# Patient Record
Sex: FEMALE | Race: BLACK OR AFRICAN AMERICAN
[De-identification: names, ages, dates, MRNs, and addresses within clinical notes are randomized per-mention and may not be internally consistent; named-entity substitution may affect disease eponyms.]

---

## 2017-04-02 ENCOUNTER — HOSPITAL ENCOUNTER (EMERGENCY)
Dept: HOSPITAL 74 - JERFT | Age: 37
Discharge: HOME | End: 2017-04-02
Payer: COMMERCIAL

## 2017-04-02 VITALS — TEMPERATURE: 98.5 F | DIASTOLIC BLOOD PRESSURE: 91 MMHG | HEART RATE: 78 BPM | SYSTOLIC BLOOD PRESSURE: 184 MMHG

## 2017-04-02 VITALS — BODY MASS INDEX: 57.9 KG/M2
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
## 2017-04-02 DIAGNOSIS — S61.411A: Primary | ICD-10-CM

## 2017-04-02 LAB
APPEARANCE UR: CLEAR
BILIRUB UR STRIP.AUTO-MCNC: NEGATIVE MG/DL
COLOR UR: (no result)
KETONES UR QL STRIP: NEGATIVE
LEUKOCYTE ESTERASE UR QL STRIP.AUTO: NEGATIVE
NITRITE UR QL STRIP: NEGATIVE
PH UR: 6 [PH] (ref 5–8)
PROT UR QL STRIP: NEGATIVE
PROT UR QL STRIP: NEGATIVE
RBC # UR STRIP: NEGATIVE /UL
SP GR UR: 1.02 (ref 1–1.03)
UROBILINOGEN UR STRIP-MCNC: NEGATIVE E.U./DL (ref 0.2–1)

## 2017-04-02 PROCEDURE — 3E0234Z INTRODUCTION OF SERUM, TOXOID AND VACCINE INTO MUSCLE, PERCUTANEOUS APPROACH: ICD-10-PCS
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
## 2018-10-27 ENCOUNTER — HOSPITAL ENCOUNTER (EMERGENCY)
Dept: HOSPITAL 74 - JERFT | Age: 38
Discharge: HOME | End: 2018-10-27
Payer: COMMERCIAL

## 2018-10-27 VITALS — SYSTOLIC BLOOD PRESSURE: 163 MMHG | DIASTOLIC BLOOD PRESSURE: 77 MMHG | TEMPERATURE: 98.1 F | HEART RATE: 96 BPM

## 2018-10-27 VITALS — BODY MASS INDEX: 59.5 KG/M2
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
## 2018-10-27 DIAGNOSIS — S13.4XXA: Primary | ICD-10-CM
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
